# Patient Record
Sex: FEMALE | Race: WHITE | Employment: FULL TIME | URBAN - METROPOLITAN AREA
[De-identification: names, ages, dates, MRNs, and addresses within clinical notes are randomized per-mention and may not be internally consistent; named-entity substitution may affect disease eponyms.]

---

## 2019-10-09 ENCOUNTER — OFFICE VISIT (OUTPATIENT)
Dept: ORTHOPEDIC SURGERY | Age: 54
End: 2019-10-09
Payer: COMMERCIAL

## 2019-10-09 VITALS
HEIGHT: 67 IN | HEART RATE: 56 BPM | SYSTOLIC BLOOD PRESSURE: 115 MMHG | BODY MASS INDEX: 29.03 KG/M2 | WEIGHT: 185 LBS | DIASTOLIC BLOOD PRESSURE: 70 MMHG

## 2019-10-09 DIAGNOSIS — G89.29 CHRONIC PAIN OF LEFT KNEE: Primary | ICD-10-CM

## 2019-10-09 DIAGNOSIS — M17.12 PATELLOFEMORAL ARTHRITIS OF LEFT KNEE: ICD-10-CM

## 2019-10-09 DIAGNOSIS — M25.562 CHRONIC PAIN OF LEFT KNEE: Primary | ICD-10-CM

## 2019-10-09 PROCEDURE — 1036F TOBACCO NON-USER: CPT | Performed by: ORTHOPAEDIC SURGERY

## 2019-10-09 PROCEDURE — G8427 DOCREV CUR MEDS BY ELIG CLIN: HCPCS | Performed by: ORTHOPAEDIC SURGERY

## 2019-10-09 PROCEDURE — 3017F COLORECTAL CA SCREEN DOC REV: CPT | Performed by: ORTHOPAEDIC SURGERY

## 2019-10-09 PROCEDURE — 99203 OFFICE O/P NEW LOW 30 MIN: CPT | Performed by: ORTHOPAEDIC SURGERY

## 2019-10-09 PROCEDURE — G8484 FLU IMMUNIZE NO ADMIN: HCPCS | Performed by: ORTHOPAEDIC SURGERY

## 2019-10-09 PROCEDURE — G8419 CALC BMI OUT NRM PARAM NOF/U: HCPCS | Performed by: ORTHOPAEDIC SURGERY

## 2019-10-09 RX ORDER — ATENOLOL 25 MG/1
25 TABLET ORAL DAILY
COMMUNITY
Start: 2019-08-23

## 2019-10-09 RX ORDER — ZOLPIDEM TARTRATE 5 MG/1
5 TABLET ORAL NIGHTLY
COMMUNITY
Start: 2019-08-22

## 2019-10-09 RX ORDER — LEVOTHYROXINE SODIUM 0.12 MG/1
125 TABLET ORAL DAILY
COMMUNITY
Start: 2019-09-12

## 2019-10-09 RX ORDER — OXYBUTYNIN CHLORIDE 10 MG/1
10 TABLET, EXTENDED RELEASE ORAL DAILY
COMMUNITY
Start: 2019-08-22

## 2021-02-24 ENCOUNTER — OFFICE VISIT (OUTPATIENT)
Dept: ORTHOPEDIC SURGERY | Age: 56
End: 2021-02-24
Payer: COMMERCIAL

## 2021-02-24 VITALS
TEMPERATURE: 97.8 F | DIASTOLIC BLOOD PRESSURE: 66 MMHG | HEART RATE: 65 BPM | HEIGHT: 68 IN | WEIGHT: 185 LBS | BODY MASS INDEX: 28.04 KG/M2 | SYSTOLIC BLOOD PRESSURE: 103 MMHG

## 2021-02-24 DIAGNOSIS — G89.29 CHRONIC PAIN OF LEFT KNEE: Primary | ICD-10-CM

## 2021-02-24 DIAGNOSIS — M25.562 CHRONIC PAIN OF LEFT KNEE: Primary | ICD-10-CM

## 2021-02-24 DIAGNOSIS — M17.12 PRIMARY OSTEOARTHRITIS OF LEFT KNEE: ICD-10-CM

## 2021-02-24 PROCEDURE — 99214 OFFICE O/P EST MOD 30 MIN: CPT | Performed by: PHYSICIAN ASSISTANT

## 2021-02-24 PROCEDURE — 20610 DRAIN/INJ JOINT/BURSA W/O US: CPT | Performed by: PHYSICIAN ASSISTANT

## 2021-02-24 RX ORDER — LIDOCAINE HYDROCHLORIDE 10 MG/ML
20 INJECTION, SOLUTION INFILTRATION; PERINEURAL ONCE
Status: COMPLETED | OUTPATIENT
Start: 2021-02-24 | End: 2021-02-24

## 2021-02-24 RX ORDER — TRIAMCINOLONE ACETONIDE 40 MG/ML
40 INJECTION, SUSPENSION INTRA-ARTICULAR; INTRAMUSCULAR ONCE
Status: COMPLETED | OUTPATIENT
Start: 2021-02-24 | End: 2021-02-24

## 2021-02-24 RX ORDER — BUPIVACAINE HYDROCHLORIDE 2.5 MG/ML
30 INJECTION, SOLUTION INFILTRATION; PERINEURAL ONCE
Status: COMPLETED | OUTPATIENT
Start: 2021-02-24 | End: 2021-02-24

## 2021-02-24 RX ORDER — ESTRADIOL 0.5 MG/1
0.5 TABLET ORAL SEE ADMIN INSTRUCTIONS
COMMUNITY
Start: 2020-09-08

## 2021-02-24 RX ADMIN — LIDOCAINE HYDROCHLORIDE 20 ML: 10 INJECTION, SOLUTION INFILTRATION; PERINEURAL at 15:52

## 2021-02-24 RX ADMIN — TRIAMCINOLONE ACETONIDE 40 MG: 40 INJECTION, SUSPENSION INTRA-ARTICULAR; INTRAMUSCULAR at 15:51

## 2021-02-24 RX ADMIN — BUPIVACAINE HYDROCHLORIDE 75 MG: 2.5 INJECTION, SOLUTION INFILTRATION; PERINEURAL at 15:51

## 2021-02-24 NOTE — PROGRESS NOTES
Patient Name: Herbert Castellon  : 1965  DOS: 2021        Chief Complaint:   Chief Complaint   Patient presents with    Knee Pain     Left knee       History of Present Illness:  Herbert Castellon is a 55 y.o. female who presents with a chief complaint of continued complaints of pain in the knee with irritation with walking, stairs and with overuse. Pain in the knee regularly with swelling and discomfort. Increase in pain over the past several years time. Currently: 2021  Patient is here for follow-up regarding bilateral knee pain and osteoarthritis of the left knee being worse than the right. She notes that the left knee is the greater focus of today's visit rating her pain is a 5-6 out of 10. She notes that she was doing fairly well until a few months ago when pain started to recur generalized around the knee mostly on the medial and anterior aspects of the knee. She denies any fall trauma or injury denies numbness burning tingling radiating pains in the leg. Notes occasional utilization of compression brace or sleeve across the knee denies utilization of any assistive walking devices. She is wanting to reevaluate the knee as to where it is in regards to the current treatment plan. She notes that she was previously utilizing anti-inflammatories which did help but she has run out over the past few months. She also notes that utilization of the steroid injections did help but has not had in a long time. She also notes that the most recent scope that she had helped significantly as well. So she is trying to figure out if she is in need of surgical intervention again or if conservative management can help to maintain her condition. Notes increasing irritation with long periods of standing walking and transition. Works in producing airline breaks as a .  She does a lot of standing walking pushing pulling and lifting equipment.       Previously: 10/9/2019  Previous arthroscopy with some relief in pain. 2014 mri with noted lateral articular disease in the joint and patellofemroal chondromalacia and articular cartilage loss. Difficulty with anterior knee pain and some radiation. Recent onset of right sided knee pain with hyperextension        Medical History  Current Medications:   Prior to Admission medications    Medication Sig Start Date End Date Taking? Authorizing Provider   estradiol (ESTRACE) 0.5 MG tablet 0.5 mg by Per G Tube route See Admin Instructions 9/8/20  Yes Historical Provider, MD   diclofenac (VOLTAREN) 50 MG EC tablet Take 1 tablet by mouth 2 times daily (with meals) 10/9/19   Rolando Llanos MD   Homeopathic Products (SPEEDGEL) GEL Apply 1-2 grams topically TID 10/9/19   Rolando Llanos MD   levothyroxine (SYNTHROID) 125 MCG tablet Take 125 mcg by mouth daily 9/12/19   Historical Provider, MD   oxybutynin (DITROPAN-XL) 10 MG extended release tablet Take 10 mg by mouth daily 8/22/19   Historical Provider, MD   atenolol (TENORMIN) 25 MG tablet Take 25 mg by mouth daily 8/23/19   Historical Provider, MD   zolpidem (AMBIEN) 5 MG tablet Take 5 mg by mouth nightly. 8/22/19   Historical Provider, MD   Cholecalciferol (VITAMIN D PO) Take 6,000 Units by mouth    Historical Provider, MD     Allergies: Allergies   Allergen Reactions    Vancomycin Other (See Comments)     Red man syndrome      Amoxicillin     Codeine Other (See Comments)     hallucinations   hallucinates      Penicillins Hives, Nausea And Vomiting, Rash and Swelling       Review of Systems:     Review of Systems ______  Constitutional: Negative for fever and diaphoresis. ____________  Respiratory: Negative for shortness of breath.  ________  Gastrointestinal: Negative for abdominal pain. ________  Musculoskeletal: Positive for joint pain. ____  Skin: Negative for itching. ____  Neurological: Negative for loss of consciousness.  ______________  All other systems reviewed and negative     Past Medical History:   Diagnosis Date    Thyroid disease      Family History   Problem Relation Age of Onset    Cancer Mother     Diabetes Mother     No Known Problems Father      Social History     Socioeconomic History    Marital status:      Spouse name: Not on file    Number of children: Not on file    Years of education: Not on file    Highest education level: Not on file   Occupational History    Not on file   Social Needs    Financial resource strain: Not on file    Food insecurity     Worry: Not on file     Inability: Not on file    Transportation needs     Medical: Not on file     Non-medical: Not on file   Tobacco Use    Smoking status: Former Smoker    Smokeless tobacco: Never Used   Substance and Sexual Activity    Alcohol use: Not on file    Drug use: Not on file    Sexual activity: Not on file   Lifestyle    Physical activity     Days per week: Not on file     Minutes per session: Not on file    Stress: Not on file   Relationships    Social connections     Talks on phone: Not on file     Gets together: Not on file     Attends Baptism service: Not on file     Active member of club or organization: Not on file     Attends meetings of clubs or organizations: Not on file     Relationship status: Not on file    Intimate partner violence     Fear of current or ex partner: Not on file     Emotionally abused: Not on file     Physically abused: Not on file     Forced sexual activity: Not on file   Other Topics Concern    Not on file   Social History Narrative    Not on file         Physical Exam __  Constitutional: She is oriented to person, place, and time and well-developed, well-nourished, and in no distress. No distress. ____  HENT:   Head: Normocephalic and atraumatic. ____  Eyes: Conjunctivae are normal. ________  Cardiovascular: Intact distal pulses.   ____  Pulmonary/Chest: Effort normal. ________________________  Neurological: She is alert and oriented to person, place, and time. ____  Skin: Skin is dry. She is not diaphoretic. ____  Psychiatric: Mood, affect and judgment normal. ______          Assessment   Vital Signs:      Vitals:    02/24/21 1430   BP: 103/66   Pulse: 65   Temp: 97.8 °F (36.6 °C)       left Knee shows evidence for mild varus pseudolaxity, pain with weight bearing, antalgic gait and obvious palpable osteophytes. Inspection: Moderate anterior swelling. Swelling is present with  Mild effusion. The posterior aspect of the knee appears to be full with tenderness. There is no erythema, rash, or ecchymosis. Range of Motion:  Right 0-120 left0-120     Pain with patellofemroal  testing    There is deformity mild varus    Strength:  Hamstrings rated: 4/5. Quadriceps rated: 5/5    Palpation: There is moderate tenderness along the patellofemroal and medial left joint line. Special Tests: Patellar Compression test is positive. Valgus & Varus test is positive. Skin: There are no rashes, ulcerations or lesions. Gait: Gait pattern is antalgic  Skin shows no rashes/ecchymosis to the affected area, no hyperesthesias, no discoloration, no temperature or color discrepancies. NEUROLOGICALLY: There is no evidence for sensory or motor deficits in the extremity. Coordination appears full with no spacticity or rigidity. Reflexes appear to be symmetric. Distal circulation intact. No signs of RSD. Additional Comments:pez planus with noted area of posterior tibial insufficiency on the right side with pain. Right sided area of patellofemroal pain and lateral jiont irritation of the patella. No instbiliy of either patella or knee. Procedure(s): Injection Procedure Note  Procedure Details     Verbal consent was obtained for the procedure. The left knee(s) was prepped with iodine and the skin was anesthetized.  Using a 22 gauge needle the left knee(s) joint is injected with 2 ml 1% lidocaine and 2 ml of triamcinolone (KENALOG) 40mg/ml under the lateral aspect of the knee. The injection site was cleansed with topical isopropyl alcohol and a dressing was applied. Complications:  None; patient tolerated the procedure well. Diagnostic Test Findings:   Xray   Have reviewed the xrays above from 02/24/21   and my impression is:4 view x-ray of the left knee were performed and reviewed today ap pa lateral and sunrise. Left knee with mild medial jiont space narrowing and min patellofemroal osteophytes. Right knee with very few changes in the knee other than mild osteophytes at the patella. Assessment and Plan:       Diagnosis Orders   1. Chronic pain of left knee  XR KNEE LEFT (MIN 4 VIEWS)    [place] ARTHROCENTESIS ASPIR&/INJ MAJOR JT/BURSA W/O US    triamcinolone acetonide (KENALOG-40) injection 40 mg    lidocaine 1 % injection 20 mL    bupivacaine (MARCAINE) 0.25 % injection 75 mg              The orders below, if any, were placed during this visit:   Orders Placed This Encounter   Procedures    XR KNEE LEFT (MIN 4 VIEWS)     Standing Status:   Future     Number of Occurrences:   1     Standing Expiration Date:   3/24/2021     Order Specific Question:   Reason for exam:     Answer:   Pain    [place] ARTHROCENTESIS ASPIR&/INJ MAJOR JT/BURSA W/O US              Treatment Plan: right sided patellofemroal pain. We discussed the pathology of patellofemoral chondromalacia and the overload of the patellofemoral surfaces, benefits of hamstring stretching and a quadriceps strengthening program, especially vastus medialis strengthening. I also discussed the benefits of a patellar stabilizing brace and NSAIDS. A short course of physical therapy was written for 2-3x/wk for 4 weeks. Home exercise program upon discharge. Follow up in 4-6 weeks when physical therapy is completed for re-evaluation. Left sided osteoarthritis. I discussed the diagnosis of arthritis with the patient.   We've discussed treatment options which would include anti-inflammatory medication, physical therapy, bracing, cortisone injections, and Visco supplementation. We have also discussed the benefits of low impact exercise and weight loss. We have also discussed the possibility of joint replacement surgery in the future. Provided patient with cortisone injection at today's visit no complaints or concerns arisen advised to rest elevation of the next 24 hours utilization of ice of the next 24 hours can resume normal activities after 24 hours. We will restart utilization of diclofenac 50 mg twice daily  Plan for follow-up in 3 to 6 months for repeat evaluation possible repeat cortisone injection if need be. -Antonio potential pursuance of MRI if limited improvement is noted with utilization of the cortisone injections and anti-inflammatories. Start on natural supplemetns. Shoe inserts for the pez planus,   chopat strap for the right knee. Start on physical therpay for the knees   Spent 30 minutes in discussion with the patient regarding conditions and current treatment plan  Plan for additional injections and treatment as neede. JONATHAN Whittington

## 2021-02-24 NOTE — PROGRESS NOTES
Administrations This Visit     bupivacaine (MARCAINE) 0.25 % injection 75 mg     Admin Date  02/24/2021  15:51 Action  Given Dose  75 mg Route  Intradermal Site  Knee Left [facility]  [facility]    Ordering Provider: JONATHAN Thompson    NDC: 44177-345-13    Lot#: [id number]    : [facility]    Patient Supplied?: No          lidocaine 1 % injection 20 mL     Admin Date  02/24/2021  15:52 Action  Given Dose  20 mL Route  Intradermal Site  Knee Left Administered By  [facility]    Ordering Provider: JONATHAN Thompson    NDC: 6194-5227-77    Lot#: [id number]    : [facility]    Patient Supplied?: No          triamcinolone acetonide (KENALOG-40) injection 40 mg     Admin Date  02/24/2021  15:51 Action  Given Dose  40 mg Route  Intramuscular Site  Knee Left Administered By  [facility]    Ordering Provider: JONATHAN Thompson    NDC: 0032-4564-80    Lot#: [id number]    : [facility]    Patient Supplied?: No

## 2021-05-27 ENCOUNTER — OFFICE VISIT (OUTPATIENT)
Dept: ORTHOPEDIC SURGERY | Age: 56
End: 2021-05-27
Payer: COMMERCIAL

## 2021-05-27 VITALS
WEIGHT: 185 LBS | HEART RATE: 64 BPM | DIASTOLIC BLOOD PRESSURE: 79 MMHG | SYSTOLIC BLOOD PRESSURE: 118 MMHG | BODY MASS INDEX: 28.04 KG/M2 | HEIGHT: 68 IN

## 2021-05-27 DIAGNOSIS — M25.562 CHRONIC PAIN OF LEFT KNEE: Primary | ICD-10-CM

## 2021-05-27 DIAGNOSIS — M17.12 PRIMARY OSTEOARTHRITIS OF LEFT KNEE: ICD-10-CM

## 2021-05-27 DIAGNOSIS — M79.662 PAIN AND SWELLING OF LEFT LOWER LEG: ICD-10-CM

## 2021-05-27 DIAGNOSIS — G89.29 CHRONIC PAIN OF LEFT KNEE: Primary | ICD-10-CM

## 2021-05-27 DIAGNOSIS — M79.89 PAIN AND SWELLING OF LEFT LOWER LEG: ICD-10-CM

## 2021-05-27 PROCEDURE — 99214 OFFICE O/P EST MOD 30 MIN: CPT | Performed by: PHYSICIAN ASSISTANT

## 2021-05-27 RX ORDER — PREDNISONE 10 MG/1
10 TABLET ORAL DAILY
Qty: 10 TABLET | Refills: 0 | Status: SHIPPED | OUTPATIENT
Start: 2021-05-27 | End: 2021-06-06

## 2021-05-28 NOTE — PROGRESS NOTES
Patient Name: Mandy Pozo  : 1965  DOS: 2021        Chief Complaint:   Chief Complaint   Patient presents with    Knee Pain     LEFT Euna Avers on 21 helped. Now have pain and swelling. History of Present Illness:  Mandy Pozo is a 55 y.o. female who presents with a chief complaint of continued complaints of pain in the knee with irritation with walking, stairs and with overuse. Pain in the knee regularly with swelling and discomfort. Increase in pain over the past several years time. Currently: 2021  Patient is here for follow-up regarding significant increase in pain on the left knee. 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 Previously: 2021  Patient is here for follow-up regarding bilateral knee pain and osteoarthritis of the left knee being worse than the right. She notes that the left knee is the greater focus of today's visit rating her pain is a 5-6 out of 10.   She notes that she was doing fairly well until a few months ago when pain started to recur generalized around the knee mostly on the medial and anterior aspects of (SYNTHROID) 125 MCG tablet Take 125 mcg by mouth daily 9/12/19   Historical Provider, MD   oxybutynin (DITROPAN-XL) 10 MG extended release tablet Take 10 mg by mouth daily 8/22/19   Historical Provider, MD   atenolol (TENORMIN) 25 MG tablet Take 25 mg by mouth daily 8/23/19   Historical Provider, MD   zolpidem (AMBIEN) 5 MG tablet Take 5 mg by mouth nightly. 8/22/19   Historical Provider, MD   Cholecalciferol (VITAMIN D PO) Take 6,000 Units by mouth    Historical Provider, MD     Allergies: Allergies   Allergen Reactions    Vancomycin Other (See Comments)     Red man syndrome      Amoxicillin     Codeine Other (See Comments)     hallucinations   hallucinates      Penicillins Hives, Nausea And Vomiting, Rash and Swelling       Review of Systems:     Review of Systems ______  Constitutional: Negative for fever and diaphoresis. ____________  Respiratory: Negative for shortness of breath.  ________  Gastrointestinal: Negative for abdominal pain. ________  Musculoskeletal: Positive for joint pain. ____  Skin: Negative for itching. ____  Neurological: Negative for loss of consciousness.  ______________  All other systems reviewed and negative     Past Medical History:   Diagnosis Date    Thyroid disease      Family History   Problem Relation Age of Onset    Cancer Mother     Diabetes Mother     No Known Problems Father      Social History     Socioeconomic History    Marital status:      Spouse name: Not on file    Number of children: Not on file    Years of education: Not on file    Highest education level: Not on file   Occupational History    Not on file   Tobacco Use    Smoking status: Former Smoker    Smokeless tobacco: Never Used   Substance and Sexual Activity    Alcohol use: Not on file    Drug use: Not on file    Sexual activity: Not on file   Other Topics Concern    Not on file   Social History Narrative    Not on file     Social Determinants of Health     Financial Resource Strain:  Difficulty of Paying Living Expenses:    Food Insecurity:     Worried About Running Out of Food in the Last Year:     Ran Out of Food in the Last Year:    Transportation Needs:     Lack of Transportation (Medical):  Lack of Transportation (Non-Medical):    Physical Activity:     Days of Exercise per Week:     Minutes of Exercise per Session:    Stress:     Feeling of Stress :    Social Connections:     Frequency of Communication with Friends and Family:     Frequency of Social Gatherings with Friends and Family:     Attends Faith Services:     Active Member of Clubs or Organizations:     Attends Club or Organization Meetings:     Marital Status:    Intimate Partner Violence:     Fear of Current or Ex-Partner:     Emotionally Abused:     Physically Abused:     Sexually Abused:          Physical Exam __  Constitutional: She is oriented to person, place, and time and well-developed, well-nourished, and in no distress. No distress. ____  HENT:   Head: Normocephalic and atraumatic. ____  Eyes: Conjunctivae are normal. ________  Cardiovascular: Intact distal pulses. ____  Pulmonary/Chest: Effort normal. ________________________  Neurological: She is alert and oriented to person, place, and time. ____  Skin: Skin is dry. She is not diaphoretic. ____  Psychiatric: Mood, affect and judgment normal. ______          Assessment   Vital Signs:      Vitals:    05/27/21 1522   BP: 118/79   Pulse: 64       left Knee shows evidence for mild varus pseudolaxity, pain with weight bearing, antalgic gait and obvious palpable osteophytes. Inspection: Moderate anterior swelling. Swelling is present with  Mild effusion. The posterior aspect of the knee appears to be full with tenderness. There is no erythema, rash, or ecchymosis. Range of Motion:  Right 0-120 left0-120     Pain with patellofemroal  testing    There is deformity mild varus    Strength:  Hamstrings rated: 4/5.  Quadriceps rated: 5/5 Palpation: There is severe  tenderness along the medial left joint line and to medial tibia and tibia and femur femoral condyle. Moderate tenderness in the patellofemoral joint line    Special Tests: Patellar Compression test is positive. Valgus & Varus test is positive. Skin: There are no rashes, ulcerations or lesions. Gait: Gait pattern is antalgic  Skin shows no rashes/ecchymosis to the affected area, no hyperesthesias, no discoloration, no temperature or color discrepancies. NEUROLOGICALLY: There is no evidence for sensory or motor deficits in the extremity. Coordination appears full with no spacticity or rigidity. Reflexes appear to be symmetric. Distal circulation intact. No signs of RSD. Additional Comments:pez planus with noted area of posterior tibial insufficiency on the right side with pain. Right sided area of patellofemroal pain and lateral jiont irritation of the patella. No instbiliy of either patella or knee. Procedure(s): No new procedure performed at today's visit      Diagnostic Test Findings:   Xray   Have reviewed the xrays above from 2/24/2021  and my impression is:4 view x-ray of the left knee were performed and reviewed today ap pa lateral and sunrise. Left knee with mild medial jiont space narrowing and min patellofemroal osteophytes. Right knee with very few changes in the knee other than mild osteophytes at the patella. Assessment and Plan:       Diagnosis Orders   1. Chronic pain of left knee  MRI KNEE LEFT WO CONTRAST   2. Primary osteoarthritis of left knee  MRI KNEE LEFT WO CONTRAST   3.  Pain and swelling of left lower leg  US DUP LOWER EXTREMITY LEFT ADIEL              The orders below, if any, were placed during this visit:   Orders Placed This Encounter   Procedures    US DUP LOWER EXTREMITY LEFT ADIEL     Standing Status:   Future     Standing Expiration Date:   5/27/2022     Order Specific Question:   Reason for exam:     Answer:   rule out DVT    MRI KNEE LEFT WO CONTRAST     Standing Status:   Future     Standing Expiration Date:   5/27/2022     Scheduling Instructions:      Adithya once approved     Order Specific Question:   Reason for exam:     Answer:   rule out fracture or meniscal tear              Treatment Plan: right sided patellofemroal pain. We discussed the pathology of patellofemoral chondromalacia and the overload of the patellofemoral surfaces, benefits of hamstring stretching and a quadriceps strengthening program, especially vastus medialis strengthening. I also discussed the benefits of a patellar stabilizing brace and NSAIDS. A short course of physical therapy was written for 2-3x/wk for 4 weeks. Home exercise program upon discharge. Follow up in 4-6 weeks when physical therapy is completed for re-evaluation. Left sided osteoarthritis. I discussed the diagnosis of arthritis with the patient. We've discussed treatment options which would include anti-inflammatory medication, physical therapy, bracing, cortisone injections, and Visco supplementation. We have also discussed the benefits of low impact exercise and weight loss. We have also discussed the possibility of joint replacement surgery in the future. -Advised to continue utilization of diclofenac  -Ordered MRI of the left knee due to significant increase in pain concerns for stress related fracture or acute meniscal tear. -Gave patient a prednisone 10 mg 1 daily for 10 days to help with reduction pain inflammation  -Advised patient to rest ice and elevate the knee as much as possible to help with reduction in pain. 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 Start on natural supplemetns. Shoe inserts for the pez planus,   chopat strap for the right knee.    Start on physical therpay for the knees   Advised for patient to follow-up after MRI is completed discussed with her the results and treatment options. Spent 30 minutes in discussion with the patient regarding conditions and current treatment plan  Plan for additional injections and treatment as neede. JONATHAN Leonard

## 2021-06-07 ENCOUNTER — TELEPHONE (OUTPATIENT)
Dept: FAMILY MEDICINE CLINIC | Age: 56
End: 2021-06-07

## 2021-06-07 NOTE — TELEPHONE ENCOUNTER
Patient notified that she can go ahead and schedule the MRI. Phone number to schedule given to patient.

## 2021-06-12 ENCOUNTER — HOSPITAL ENCOUNTER (OUTPATIENT)
Dept: MRI IMAGING | Age: 56
Discharge: HOME | End: 2021-06-12
Payer: COMMERCIAL

## 2021-06-12 DIAGNOSIS — G89.29 CHRONIC PAIN OF LEFT KNEE: ICD-10-CM

## 2021-06-12 DIAGNOSIS — M17.12 PRIMARY OSTEOARTHRITIS OF LEFT KNEE: ICD-10-CM

## 2021-06-12 DIAGNOSIS — M25.562 CHRONIC PAIN OF LEFT KNEE: ICD-10-CM

## 2021-06-12 PROCEDURE — 73721 MRI JNT OF LWR EXTRE W/O DYE: CPT

## 2021-06-23 ENCOUNTER — OFFICE VISIT (OUTPATIENT)
Dept: ORTHOPEDIC SURGERY | Age: 56
End: 2021-06-23
Payer: COMMERCIAL

## 2021-06-23 VITALS
DIASTOLIC BLOOD PRESSURE: 67 MMHG | SYSTOLIC BLOOD PRESSURE: 106 MMHG | BODY MASS INDEX: 28.04 KG/M2 | WEIGHT: 185 LBS | HEIGHT: 68 IN | HEART RATE: 59 BPM

## 2021-06-23 DIAGNOSIS — G89.29 CHRONIC PAIN OF LEFT KNEE: Primary | ICD-10-CM

## 2021-06-23 DIAGNOSIS — M17.12 PRIMARY OSTEOARTHRITIS OF LEFT KNEE: ICD-10-CM

## 2021-06-23 DIAGNOSIS — M25.562 CHRONIC PAIN OF LEFT KNEE: Primary | ICD-10-CM

## 2021-06-23 PROCEDURE — 99214 OFFICE O/P EST MOD 30 MIN: CPT | Performed by: ORTHOPAEDIC SURGERY

## 2021-06-23 NOTE — PROGRESS NOTES
Patient Name: Elizabeth Horan  : 1965  DOS: 2021        Chief Complaint:   Chief Complaint   Patient presents with    Knee Pain     LT KNEE-MRI RESULTS     Pain in the knee with irritation and dysfunction despite injection. Only sevreal months of pain control but no improvement once injection wore off. Limited activities overall because of knee disorder. History of Present Illness:  Elizabeth Horan is a 55 y.o. female who presents with a chief complaint of continued complaints of pain in the knee with irritation with walking, stairs and with overuse. Pain in the knee regularly with swelling and discomfort. Increase in pain over the past several years time. Currently: 2021  Patient is here for follow-up regarding significant increase in pain on the left knee. 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 Previously: 2021  Patient is here for follow-up regarding bilateral knee pain and osteoarthritis of the left knee being worse than the right. She notes that the left knee is the greater focus of today's visit rating her pain is a 5-6 out of 10. She notes that she was doing fairly well until a few months ago when pain started to recur generalized around the knee mostly on the medial and anterior aspects of the knee. She denies any fall trauma or injury denies numbness burning tingling radiating pains in the leg. Notes occasional utilization of compression brace or sleeve across the knee denies utilization of any assistive walking devices. She is wanting to reevaluate the knee as to where it is in regards to the current treatment plan. She notes that she was previously utilizing anti-inflammatories which did help but she has run out over the past few months. She also notes that utilization of the steroid injections did help but has not had in a long time. She also notes that the most recent scope that she had helped significantly as well. So she is trying to figure out if she is in need of surgical intervention again or if conservative management can help to maintain her condition. Notes increasing irritation with long periods of standing walking and transition. Works in producing airline breaks as a .  She does a lot of standing walking pushing pulling and lifting equipment. Previously: 10/9/2019  Previous arthroscopy with some relief in pain. 2014 mri with noted lateral articular disease in the joint and patellofemroal chondromalacia and articular cartilage loss. Difficulty with anterior knee pain and some radiation. Recent onset of right sided knee pain with hyperextension        Medical History  Current Medications:   Prior to Admission medications    Medication Sig Start Date End Date Taking?  Authorizing Provider   estradiol (ESTRACE) 0.5 MG tablet 0.5 mg by Per G Tube route See Admin Instructions 9/8/20   Historical Provider, MD   diclofenac (VOLTAREN) 50 MG EC tablet Take 1 tablet by mouth 2 times daily (with meals) 2/24/21   JONATHAN Vasquez   Homeopathic Products (SPEEDGEL) GEL Apply 1-2 grams topically TID 10/9/19   Jigar Shea MD   levothyroxine (SYNTHROID) 125 MCG tablet Take 125 mcg by mouth daily 9/12/19   Historical Provider, MD   oxybutynin (DITROPAN-XL) 10 MG extended release tablet Take 10 mg by mouth daily 8/22/19   Historical Provider, MD   atenolol (TENORMIN) 25 MG tablet Take 25 mg by mouth daily 8/23/19   Historical Provider, MD   zolpidem (AMBIEN) 5 MG tablet Take 5 mg by mouth nightly. 8/22/19   Historical Provider, MD   Cholecalciferol (VITAMIN D PO) Take 6,000 Units by mouth    Historical Provider, MD     Allergies: Allergies   Allergen Reactions    Vancomycin Other (See Comments)     Red man syndrome      Amoxicillin     Codeine Other (See Comments)     hallucinations   hallucinates      Penicillins Hives, Nausea And Vomiting, Rash and Swelling       Review of Systems:     Review of Systems ______  Constitutional: Negative for fever and diaphoresis. ____________  Respiratory: Negative for shortness of breath.  ________  Gastrointestinal: Negative for abdominal pain. ________  Musculoskeletal: Positive for joint pain. ____  Skin: Negative for itching. ____  Neurological: Negative for loss of consciousness.  ______________  All other systems reviewed and negative     Past Medical History:   Diagnosis Date    Thyroid disease      Family History   Problem Relation Age of Onset    Cancer Mother     Diabetes Mother     No Known Problems Father      Social History     Socioeconomic History    Marital status:      Spouse name: Not on file    Number of children: Not on file    Years of education: Not on file    Highest education level: Not on file   Occupational History    Not on file   Tobacco Use    Smoking status: Former Smoker    Smokeless tobacco: Never Used   Substance and Sexual Activity    Alcohol use: Not on file    Drug use: Not on file    Sexual activity: Not on file   Other Topics Concern    Not on file   Social History Narrative    Not on file     Social Determinants of Health     Financial Resource Strain:     Difficulty of Paying Living Expenses:    Food Insecurity:     Worried About Running Out of Food in the Last Year:     [address] in the Last Year:    Transportation Needs:     Lack of Transportation (Medical):  Lack of Transportation (Non-Medical):    Physical Activity:     Days of Exercise per Week:     Minutes of Exercise per Session:    Stress:     Feeling of Stress :    Social Connections:     Frequency of Communication with Friends and Family:     Frequency of Social Gatherings with Friends and Family:     Attends Islam Services:     Active Member of Clubs or Organizations:     Attends Club or Organization Meetings:     Marital Status:    Intimate Partner Violence:     Fear of Current or Ex-Partner:     Emotionally Abused:     Physically Abused:     Sexually Abused:          Physical Exam __  Constitutional: She is oriented to person, place, and time and well-developed, well-nourished, and in no distress. No distress. ____  HENT:   Head: Normocephalic and atraumatic. ____  Eyes: Conjunctivae are normal. ________  Cardiovascular: Intact distal pulses. ____  Pulmonary/Chest: Effort normal. ________________________  Neurological: She is alert and oriented to person, place, and time. ____  Skin: Skin is dry. She is not diaphoretic. ____  Psychiatric: Mood, affect and judgment normal. ______          Assessment   Vital Signs:      Vitals:    06/23/21 1410   BP: 106/67   Pulse: 59       left Knee shows evidence for mild varus pseudolaxity, pain with weight bearing, antalgic gait and obvious palpable osteophytes. Inspection: Moderate anterior swelling. Swelling is present with  Mild effusion. The posterior aspect of the knee appears to be full with tenderness. There is no erythema, rash, or ecchymosis.      Range of Motion:  Right 0-120 left0-120     Pain with patellofemroal  testing    There is deformity mild varus    Strength:  Hamstrings rated: 4/5. Quadriceps rated: 5/5    Palpation: There is severe  tenderness along the medial left joint line and to medial tibia and tibia and femur femoral condyle. Moderate tenderness in the patellofemoral joint line    Special Tests: Patellar Compression test is positive. Valgus & Varus test is positive. Skin: There are no rashes, ulcerations or lesions. Gait: Gait pattern is antalgic  Skin shows no rashes/ecchymosis to the affected area, no hyperesthesias, no discoloration, no temperature or color discrepancies. NEUROLOGICALLY: There is no evidence for sensory or motor deficits in the extremity. Coordination appears full with no spacticity or rigidity. Reflexes appear to be symmetric. Distal circulation intact. No signs of RSD. Additional Comments:pez planus with noted area of posterior tibial insufficiency on the right side with pain. Right sided area of patellofemroal pain and lateral jiont irritation of the patella. No instbiliy of either patella or knee. Procedure(s): No new procedure performed at today's visit      Diagnostic Test Findings:   Xray   Have reviewed the xrays above from 2/24/2021  and my impression is:4 view x-ray of the left knee were performed and reviewed today ap pa lateral and sunrise. Left knee with mild medial jiont space narrowing and min patellofemroal osteophytes. Right knee with very few changes in the knee other than mild osteophytes at the patella. Assessment and Plan:       Diagnosis Orders   1. Chronic pain of left knee     2. Primary osteoarthritis of left knee                The orders below, if any, were placed during this visit:   No orders of the defined types were placed in this encounter. Treatment Plan: right sided patellofemroal pain.    We discussed the pathology of patellofemoral chondromalacia and the overload of the patellofemoral surfaces, benefits of hamstring stretching and a quadriceps strengthening program, especially vastus medialis strengthening. I also discussed the benefits of a patellar stabilizing brace and NSAIDS. A short course of physical therapy was written for 2-3x/wk for 4 weeks. Home exercise program upon discharge. Follow up in 4-6 weeks when physical therapy is completed for re-evaluation. Left sided osteoarthritis. I discussed the diagnosis of arthritis with the patient. We've discussed treatment options which would include anti-inflammatory medication, physical therapy, bracing, cortisone injections, and Visco supplementation. We have also discussed the benefits of low impact exercise and weight loss. We have also discussed the possibility of joint replacement surgery in the future. -Advised to continue utilization of diclofenac  - MRI of the left knee due to significant increase in pain and noted for stress related fracture or acute meniscal tear. Start on natural supplemetns.    Shoe inserts for the pez planus,           Spent 30 minutes in discussion with the patient regarding conditions and current treatment plan  Plan for additional injections and treatment as neede.carmen Li MD

## 2021-07-07 ENCOUNTER — TELEPHONE (OUTPATIENT)
Dept: ORTHOPEDIC SURGERY | Age: 56
End: 2021-07-07

## 2021-07-07 NOTE — TELEPHONE ENCOUNTER
Appointment Request     Patient requesting earlier appointment: No  Appointment offered to patient: post op/ none/unable to find an appt in timeframe of surgery 07/21. Patient only wants to see Dr. Mark Anthony Adam.   Patient Contact Number: [phone]

## 2021-07-09 ENCOUNTER — TELEPHONE (OUTPATIENT)
Dept: ORTHOPEDIC SURGERY | Age: 56
End: 2021-07-09

## 2021-07-20 ENCOUNTER — TELEPHONE (OUTPATIENT)
Dept: ORTHOPEDIC SURGERY | Age: 56
End: 2021-07-20

## 2021-07-20 ENCOUNTER — ANESTHESIA EVENT (OUTPATIENT)
Dept: OPERATING ROOM | Age: 56
End: 2021-07-20
Payer: COMMERCIAL

## 2021-07-21 ENCOUNTER — HOSPITAL ENCOUNTER (OUTPATIENT)
Age: 56
Setting detail: OUTPATIENT SURGERY
Discharge: HOME | End: 2021-07-21
Attending: ORTHOPAEDIC SURGERY | Admitting: ORTHOPAEDIC SURGERY
Payer: COMMERCIAL

## 2021-07-21 ENCOUNTER — ANESTHESIA (OUTPATIENT)
Dept: OPERATING ROOM | Age: 56
End: 2021-07-21
Payer: COMMERCIAL

## 2021-07-21 VITALS
BODY MASS INDEX: 29.03 KG/M2 | WEIGHT: 185 LBS | DIASTOLIC BLOOD PRESSURE: 78 MMHG | TEMPERATURE: 97.9 F | SYSTOLIC BLOOD PRESSURE: 148 MMHG | RESPIRATION RATE: 20 BRPM | OXYGEN SATURATION: 99 % | HEART RATE: 88 BPM | HEIGHT: 67 IN

## 2021-07-21 VITALS
OXYGEN SATURATION: 98 % | DIASTOLIC BLOOD PRESSURE: 56 MMHG | TEMPERATURE: 98.6 F | SYSTOLIC BLOOD PRESSURE: 90 MMHG | RESPIRATION RATE: 19 BRPM

## 2021-07-21 DIAGNOSIS — Z98.890 STATUS POST ARTHROSCOPY OF RIGHT KNEE: Primary | ICD-10-CM

## 2021-07-21 PROCEDURE — 6360000002 HC RX W HCPCS

## 2021-07-21 PROCEDURE — 7100000001 HC PACU RECOVERY - ADDTL 15 MIN: Performed by: ORTHOPAEDIC SURGERY

## 2021-07-21 PROCEDURE — 2580000003 HC RX 258: Performed by: ANESTHESIOLOGY

## 2021-07-21 PROCEDURE — 3700000000 HC ANESTHESIA ATTENDED CARE: Performed by: ORTHOPAEDIC SURGERY

## 2021-07-21 PROCEDURE — 3700000001 HC ADD 15 MINUTES (ANESTHESIA): Performed by: ORTHOPAEDIC SURGERY

## 2021-07-21 PROCEDURE — 2500000003 HC RX 250 WO HCPCS: Performed by: ORTHOPAEDIC SURGERY

## 2021-07-21 PROCEDURE — 6360000002 HC RX W HCPCS: Performed by: ORTHOPAEDIC SURGERY

## 2021-07-21 PROCEDURE — 2580000003 HC RX 258: Performed by: ORTHOPAEDIC SURGERY

## 2021-07-21 PROCEDURE — 2500000003 HC RX 250 WO HCPCS

## 2021-07-21 PROCEDURE — C1763 CONN TISS, NON-HUMAN: HCPCS | Performed by: ORTHOPAEDIC SURGERY

## 2021-07-21 PROCEDURE — 29877 ARTHRS KNEE SURG DBRDMT/SHVG: CPT | Performed by: ORTHOPAEDIC SURGERY

## 2021-07-21 PROCEDURE — 6370000000 HC RX 637 (ALT 250 FOR IP): Performed by: PHYSICIAN ASSISTANT

## 2021-07-21 PROCEDURE — 20600 DRAIN/INJ JOINT/BURSA W/O US: CPT | Performed by: ORTHOPAEDIC SURGERY

## 2021-07-21 PROCEDURE — 2720000010 HC SURG SUPPLY STERILE: Performed by: ORTHOPAEDIC SURGERY

## 2021-07-21 PROCEDURE — 3600000014 HC SURGERY LEVEL 4 ADDTL 15MIN: Performed by: ORTHOPAEDIC SURGERY

## 2021-07-21 PROCEDURE — 27599 UNLISTED PX FEMUR/KNEE: CPT | Performed by: PHYSICIAN ASSISTANT

## 2021-07-21 PROCEDURE — 3600000004 HC SURGERY LEVEL 4 BASE: Performed by: ORTHOPAEDIC SURGERY

## 2021-07-21 PROCEDURE — 20900 REMOVAL OF BONE FOR GRAFT: CPT | Performed by: ORTHOPAEDIC SURGERY

## 2021-07-21 PROCEDURE — 7100000011 HC PHASE II RECOVERY - ADDTL 15 MIN: Performed by: ORTHOPAEDIC SURGERY

## 2021-07-21 PROCEDURE — 7100000000 HC PACU RECOVERY - FIRST 15 MIN: Performed by: ORTHOPAEDIC SURGERY

## 2021-07-21 PROCEDURE — 2709999900 HC NON-CHARGEABLE SUPPLY: Performed by: ORTHOPAEDIC SURGERY

## 2021-07-21 PROCEDURE — 77002 NEEDLE LOCALIZATION BY XRAY: CPT | Performed by: ORTHOPAEDIC SURGERY

## 2021-07-21 PROCEDURE — 6360000002 HC RX W HCPCS: Performed by: ANESTHESIOLOGY

## 2021-07-21 PROCEDURE — 27599 UNLISTED PX FEMUR/KNEE: CPT | Performed by: ORTHOPAEDIC SURGERY

## 2021-07-21 PROCEDURE — 7100000010 HC PHASE II RECOVERY - FIRST 15 MIN: Performed by: ORTHOPAEDIC SURGERY

## 2021-07-21 PROCEDURE — 6370000000 HC RX 637 (ALT 250 FOR IP): Performed by: ORTHOPAEDIC SURGERY

## 2021-07-21 DEVICE — GRAFT BONE SUB 5CC FIL BONE VOID SOLUM IV: Type: IMPLANTABLE DEVICE | Site: KNEE | Status: FUNCTIONAL

## 2021-07-21 RX ORDER — SODIUM CHLORIDE 0.9 % (FLUSH) 0.9 %
5-40 SYRINGE (ML) INJECTION EVERY 12 HOURS SCHEDULED
Status: DISCONTINUED | OUTPATIENT
Start: 2021-07-21 | End: 2021-07-21 | Stop reason: HOSPADM

## 2021-07-21 RX ORDER — OXYCODONE HYDROCHLORIDE AND ACETAMINOPHEN 5; 325 MG/1; MG/1
2 TABLET ORAL PRN
Status: COMPLETED | OUTPATIENT
Start: 2021-07-21 | End: 2021-07-21

## 2021-07-21 RX ORDER — 0.9 % SODIUM CHLORIDE 0.9 %
INTRAVENOUS SOLUTION INTRAVENOUS CONTINUOUS PRN
Status: COMPLETED | OUTPATIENT
Start: 2021-07-21 | End: 2021-07-21

## 2021-07-21 RX ORDER — HEPARIN SODIUM 5000 [USP'U]/ML
INJECTION, SOLUTION INTRAVENOUS; SUBCUTANEOUS
Status: DISCONTINUED
Start: 2021-07-21 | End: 2021-07-21 | Stop reason: WASHOUT

## 2021-07-21 RX ORDER — SODIUM CHLORIDE 9 MG/ML
25 INJECTION, SOLUTION INTRAVENOUS PRN
Status: DISCONTINUED | OUTPATIENT
Start: 2021-07-21 | End: 2021-07-21 | Stop reason: HOSPADM

## 2021-07-21 RX ORDER — OXYCODONE HYDROCHLORIDE AND ACETAMINOPHEN 5; 325 MG/1; MG/1
1 TABLET ORAL PRN
Status: COMPLETED | OUTPATIENT
Start: 2021-07-21 | End: 2021-07-21

## 2021-07-21 RX ORDER — CELECOXIB 200 MG/1
200 CAPSULE ORAL ONCE
Status: COMPLETED | OUTPATIENT
Start: 2021-07-21 | End: 2021-07-21

## 2021-07-21 RX ORDER — ONDANSETRON 2 MG/ML
4 INJECTION INTRAMUSCULAR; INTRAVENOUS PRN
Status: DISCONTINUED | OUTPATIENT
Start: 2021-07-21 | End: 2021-07-21 | Stop reason: HOSPADM

## 2021-07-21 RX ORDER — SODIUM CHLORIDE 0.9 % (FLUSH) 0.9 %
5-40 SYRINGE (ML) INJECTION PRN
Status: DISCONTINUED | OUTPATIENT
Start: 2021-07-21 | End: 2021-07-21 | Stop reason: HOSPADM

## 2021-07-21 RX ORDER — ONDANSETRON 2 MG/ML
INJECTION INTRAMUSCULAR; INTRAVENOUS PRN
Status: DISCONTINUED | OUTPATIENT
Start: 2021-07-21 | End: 2021-07-21 | Stop reason: SDUPTHER

## 2021-07-21 RX ORDER — OXYCODONE HYDROCHLORIDE AND ACETAMINOPHEN 5; 325 MG/1; MG/1
1 TABLET ORAL EVERY 6 HOURS PRN
Qty: 28 TABLET | Refills: 0 | Status: SHIPPED | OUTPATIENT
Start: 2021-07-21 | End: 2021-07-28

## 2021-07-21 RX ORDER — OXYCODONE HYDROCHLORIDE AND ACETAMINOPHEN 5; 325 MG/1; MG/1
1 TABLET ORAL PRN
Status: DISCONTINUED | OUTPATIENT
Start: 2021-07-21 | End: 2021-07-21 | Stop reason: HOSPADM

## 2021-07-21 RX ORDER — HYDRALAZINE HYDROCHLORIDE 20 MG/ML
5 INJECTION INTRAMUSCULAR; INTRAVENOUS EVERY 10 MIN PRN
Status: DISCONTINUED | OUTPATIENT
Start: 2021-07-21 | End: 2021-07-21 | Stop reason: HOSPADM

## 2021-07-21 RX ORDER — SODIUM CHLORIDE, SODIUM LACTATE, POTASSIUM CHLORIDE, CALCIUM CHLORIDE 600; 310; 30; 20 MG/100ML; MG/100ML; MG/100ML; MG/100ML
INJECTION, SOLUTION INTRAVENOUS CONTINUOUS
Status: DISCONTINUED | OUTPATIENT
Start: 2021-07-21 | End: 2021-07-21 | Stop reason: HOSPADM

## 2021-07-21 RX ORDER — CALCIUM CHLORIDE 100 MG/ML
INJECTION INTRAVENOUS; INTRAVENTRICULAR
Status: DISCONTINUED
Start: 2021-07-21 | End: 2021-07-21 | Stop reason: WASHOUT

## 2021-07-21 RX ORDER — OXYCODONE HYDROCHLORIDE AND ACETAMINOPHEN 5; 325 MG/1; MG/1
1 TABLET ORAL ONCE
Status: COMPLETED | OUTPATIENT
Start: 2021-07-21 | End: 2021-07-21

## 2021-07-21 RX ORDER — OXYCODONE HYDROCHLORIDE AND ACETAMINOPHEN 5; 325 MG/1; MG/1
2 TABLET ORAL PRN
Status: DISCONTINUED | OUTPATIENT
Start: 2021-07-21 | End: 2021-07-21 | Stop reason: HOSPADM

## 2021-07-21 RX ORDER — FENTANYL CITRATE 50 UG/ML
INJECTION, SOLUTION INTRAMUSCULAR; INTRAVENOUS PRN
Status: DISCONTINUED | OUTPATIENT
Start: 2021-07-21 | End: 2021-07-21 | Stop reason: SDUPTHER

## 2021-07-21 RX ORDER — CALCIUM CHLORIDE 100 MG/ML
INJECTION INTRAVENOUS; INTRAVENTRICULAR PRN
Status: DISCONTINUED | OUTPATIENT
Start: 2021-07-21 | End: 2021-07-21 | Stop reason: ALTCHOICE

## 2021-07-21 RX ORDER — SODIUM CHLORIDE 9 MG/ML
INJECTION INTRAVENOUS
Status: DISCONTINUED
Start: 2021-07-21 | End: 2021-07-21 | Stop reason: HOSPADM

## 2021-07-21 RX ORDER — LIDOCAINE HYDROCHLORIDE 10 MG/ML
0.3 INJECTION, SOLUTION EPIDURAL; INFILTRATION; INTRACAUDAL; PERINEURAL
Status: DISCONTINUED | OUTPATIENT
Start: 2021-07-21 | End: 2021-07-21 | Stop reason: HOSPADM

## 2021-07-21 RX ORDER — DIPHENHYDRAMINE HYDROCHLORIDE 50 MG/ML
12.5 INJECTION INTRAMUSCULAR; INTRAVENOUS
Status: DISCONTINUED | OUTPATIENT
Start: 2021-07-21 | End: 2021-07-21 | Stop reason: HOSPADM

## 2021-07-21 RX ORDER — DEXAMETHASONE SODIUM PHOSPHATE 4 MG/ML
INJECTION, SOLUTION INTRA-ARTICULAR; INTRALESIONAL; INTRAMUSCULAR; INTRAVENOUS; SOFT TISSUE PRN
Status: DISCONTINUED | OUTPATIENT
Start: 2021-07-21 | End: 2021-07-21 | Stop reason: SDUPTHER

## 2021-07-21 RX ORDER — LABETALOL HYDROCHLORIDE 5 MG/ML
5 INJECTION, SOLUTION INTRAVENOUS EVERY 10 MIN PRN
Status: DISCONTINUED | OUTPATIENT
Start: 2021-07-21 | End: 2021-07-21 | Stop reason: HOSPADM

## 2021-07-21 RX ORDER — PROPOFOL 10 MG/ML
INJECTION, EMULSION INTRAVENOUS PRN
Status: DISCONTINUED | OUTPATIENT
Start: 2021-07-21 | End: 2021-07-21 | Stop reason: SDUPTHER

## 2021-07-21 RX ORDER — HEPARIN SODIUM 5000 [USP'U]/ML
INJECTION, SOLUTION INTRAVENOUS; SUBCUTANEOUS PRN
Status: DISCONTINUED | OUTPATIENT
Start: 2021-07-21 | End: 2021-07-21 | Stop reason: ALTCHOICE

## 2021-07-21 RX ORDER — PROMETHAZINE HYDROCHLORIDE 25 MG/ML
6.25 INJECTION, SOLUTION INTRAMUSCULAR; INTRAVENOUS
Status: DISCONTINUED | OUTPATIENT
Start: 2021-07-21 | End: 2021-07-21 | Stop reason: HOSPADM

## 2021-07-21 RX ORDER — LIDOCAINE HYDROCHLORIDE 20 MG/ML
INJECTION, SOLUTION INFILTRATION; PERINEURAL PRN
Status: DISCONTINUED | OUTPATIENT
Start: 2021-07-21 | End: 2021-07-21 | Stop reason: SDUPTHER

## 2021-07-21 RX ADMIN — FENTANYL CITRATE 50 MCG: 50 INJECTION INTRAMUSCULAR; INTRAVENOUS at 14:22

## 2021-07-21 RX ADMIN — OXYCODONE HYDROCHLORIDE AND ACETAMINOPHEN 1 TABLET: 5; 325 TABLET ORAL at 11:01

## 2021-07-21 RX ADMIN — CELECOXIB 200 MG: 200 CAPSULE ORAL at 11:02

## 2021-07-21 RX ADMIN — OXYCODONE HYDROCHLORIDE AND ACETAMINOPHEN 1 TABLET: 5; 325 TABLET ORAL at 15:21

## 2021-07-21 RX ADMIN — ONDANSETRON 4 MG: 2 INJECTION, SOLUTION INTRAMUSCULAR; INTRAVENOUS at 13:29

## 2021-07-21 RX ADMIN — Medication 0.5 MG: at 14:50

## 2021-07-21 RX ADMIN — Medication 0.5 MG: at 15:11

## 2021-07-21 RX ADMIN — FENTANYL CITRATE 50 MCG: 50 INJECTION INTRAMUSCULAR; INTRAVENOUS at 14:00

## 2021-07-21 RX ADMIN — SODIUM CHLORIDE, POTASSIUM CHLORIDE, SODIUM LACTATE AND CALCIUM CHLORIDE: 600; 310; 30; 20 INJECTION, SOLUTION INTRAVENOUS at 13:20

## 2021-07-21 RX ADMIN — PROPOFOL 200 MG: 10 INJECTION, EMULSION INTRAVENOUS at 13:29

## 2021-07-21 RX ADMIN — Medication 2000 MG: at 13:29

## 2021-07-21 RX ADMIN — LIDOCAINE HYDROCHLORIDE 100 MG: 20 INJECTION, SOLUTION INFILTRATION; PERINEURAL at 13:29

## 2021-07-21 RX ADMIN — DEXAMETHASONE SODIUM PHOSPHATE 8 MG: 4 INJECTION, SOLUTION INTRAMUSCULAR; INTRAVENOUS at 13:29

## 2021-07-21 RX ADMIN — Medication 0.5 MG: at 15:02

## 2021-07-21 ASSESSMENT — PAIN SCALES - GENERAL
PAINLEVEL_OUTOF10: 7
PAINLEVEL_OUTOF10: 8
PAINLEVEL_OUTOF10: 8
PAINLEVEL_OUTOF10: 0
PAINLEVEL_OUTOF10: 8
PAINLEVEL_OUTOF10: 8
PAINLEVEL_OUTOF10: 4
PAINLEVEL_OUTOF10: 4
PAINLEVEL_OUTOF10: 7

## 2021-07-21 ASSESSMENT — PAIN - FUNCTIONAL ASSESSMENT: PAIN_FUNCTIONAL_ASSESSMENT: 0-10

## 2021-07-21 ASSESSMENT — PAIN DESCRIPTION - ORIENTATION
ORIENTATION: LEFT

## 2021-07-21 ASSESSMENT — PAIN DESCRIPTION - PAIN TYPE
TYPE: SURGICAL PAIN

## 2021-07-21 ASSESSMENT — PAIN DESCRIPTION - LOCATION
LOCATION: KNEE

## 2021-07-21 NOTE — PROGRESS NOTES
Update History & Physical    The patient's History and Physical of  7/21/2021 was reviewed with the patient and I examined the patient. There was no change. The surgical site was confirmed by the patient and me. Plan: The risks, benefits, expected outcome, and alternative to the recommended procedure have been discussed with the patient. Patient understands and wants to proceed with the procedure.      Electronically signed by Quin Stanton MD on 7/21/2021 at 1:28 PM

## 2021-07-21 NOTE — ANESTHESIA PRE PROCEDURE
Department of Anesthesiology  Preprocedure Note       Name:  Holley Acosta   Age:  55 y.o.  :  1965                                          MRN:  [id number]         Date:  2021      Surgeon: Rony Reina):  Celeste Smith MD    Procedure: Procedure(s):  LEFT KNEE VIDEO ARTHROSCOPY WITH MEDIAL MENISCECTOMY AND SUBCHONDROPLASTY    Medications prior to admission:   Prior to Admission medications    Medication Sig Start Date End Date Taking? Authorizing Provider   estradiol (ESTRACE) 0.5 MG tablet 0.5 mg by Per G Tube route See Admin Instructions 20  Yes Historical Provider, MD   levothyroxine (SYNTHROID) 125 MCG tablet Take 125 mcg by mouth daily 19  Yes Historical Provider, MD   oxybutynin (DITROPAN-XL) 10 MG extended release tablet Take 10 mg by mouth daily 19  Yes Historical Provider, MD   atenolol (TENORMIN) 25 MG tablet Take 25 mg by mouth daily 19  Yes Historical Provider, MD   zolpidem (AMBIEN) 5 MG tablet Take 5 mg by mouth nightly.  19  Yes Historical Provider, MD   Cholecalciferol (VITAMIN D PO) Take 6,000 Units by mouth   Yes Historical Provider, MD   diclofenac (VOLTAREN) 50 MG EC tablet Take 1 tablet by mouth 2 times daily (with meals) 21   JONATHAN Odom   Homeopathic Products [facility]) GEL Apply 1-2 grams topically TID 10/9/19   Celeste Smith MD       Current medications:    Current Facility-Administered Medications   Medication Dose Route Frequency Provider Last Rate Last Admin    ceFAZolin (ANCEF) 2000 mg in sterile water 20 mL IV syringe  2,000 mg Intravenous Once Celeste Smith MD        lactated ringers infusion   Intravenous Continuous Stephen Boucher MD        sodium chloride flush 0.9 % injection 5-40 mL  5-40 mL Intravenous 2 times per day Stephen Boucher MD        sodium chloride flush 0.9 % injection 5-40 mL  5-40 mL Intravenous PRN Stephen Boucher MD        0.9 % sodium chloride infusion  25 mL Intravenous PRN Charolet Bud, MD Zannie Cowden lidocaine PF 1 % injection 0.3 mL  0.3 mL Intradermal Once PRN Thien Garcia MD           Allergies: Allergies   Allergen Reactions    Vancomycin Other (See Comments)     Red man syndrome      Amoxicillin     Codeine Other (See Comments)     hallucinations   hallucinates      Penicillins Hives, Nausea And Vomiting, Rash and Swelling       Problem List:    Patient Active Problem List   Diagnosis Code    Chronic pain of left knee M25.562, G89.29    Primary osteoarthritis of left knee M17.12       Past Medical History:        Diagnosis Date    Thyroid disease        Past Surgical History:        Procedure Laterality Date    BACK SURGERY      CHOLECYSTECTOMY      HYSTERECTOMY      KNEE SURGERY Left        Social History:    Social History     Tobacco Use    Smoking status: Former Smoker    Smokeless tobacco: Never Used   Substance Use Topics    Alcohol use: Never                                Counseling given: Not Answered      Vital Signs (Current):   Vitals:    07/20/21 1100 07/21/21 1041   BP:  109/70   Pulse:  53   Resp:  16   Temp:  96.9 °F (36.1 °C)   TempSrc:  Temporal   SpO2:  100%   Weight: 189 lb (85.7 kg) 185 lb (83.9 kg)   Height: 5' 7\" (1.702 m) 5' 7\" (1.702 m)                                              BP Readings from Last 3 Encounters:   07/21/21 109/70   06/23/21 106/67   05/27/21 118/79       NPO Status: Time of last liquid consumption: 0000                        Time of last solid consumption: 0000                        Date of last liquid consumption: 07/21/21                        Date of last solid food consumption: 07/21/21    BMI:   Wt Readings from Last 3 Encounters:   07/21/21 185 lb (83.9 kg)   06/23/21 185 lb (83.9 kg)   05/27/21 185 lb (83.9 kg)     Body mass index is 28.98 kg/m².     CBC: No results found for: WBC, RBC, HGB, HCT, MCV, RDW, PLT    CMP: No results found for: NA, K, CL, CO2, BUN, CREATININE, GFRAA, AGRATIO, LABGLOM, GLUCOSE, PROT, CALCIUM, BILITOT, ALKPHOS, AST, ALT    POC Tests: No results for input(s): POCGLU, POCNA, POCK, POCCL, POCBUN, POCHEMO, POCHCT in the last 72 hours. Coags: No results found for: PROTIME, INR, APTT    HCG (If Applicable): No results found for: PREGTESTUR, PREGSERUM, HCG, HCGQUANT     ABGs: No results found for: PHART, PO2ART, GFR7NWI, MCB7KVB, BEART, R1XTYELY     Type & Screen (If Applicable):  No results found for: LABABO, LABRH    Drug/Infectious Status (If Applicable):  No results found for: HIV, HEPCAB    COVID-19 Screening (If Applicable): No results found for: COVID19        Anesthesia Evaluation  Patient summary reviewed no history of anesthetic complications:   Airway: Mallampati: II  TM distance: >3 FB   Neck ROM: full  Mouth opening: > = 3 FB Dental: normal exam         Pulmonary:Negative Pulmonary ROS and normal exam  breath sounds clear to auscultation                             Cardiovascular:Negative CV ROS  Exercise tolerance: good (>4 METS),           Rhythm: regular  Rate: normal                    Neuro/Psych:   Negative Neuro/Psych ROS              GI/Hepatic/Renal: Neg GI/Hepatic/Renal ROS            Endo/Other: Negative Endo/Other ROS   (+) hypothyroidism::., .                 Abdominal:             Vascular: negative vascular ROS. Other Findings:          Pre-Operative Diagnosis: TIBIAL AND FEMORAL INSUFFICIENCY FRACTURE, MEDIAL MENISCUS TEAR LEFT KNEE    55 y.o.   BMI:  Body mass index is 28.98 kg/m².      Vitals:    07/20/21 1100 07/21/21 1041   BP:  109/70   Pulse:  53   Resp:  16   Temp:  96.9 °F (36.1 °C)   TempSrc:  Temporal   SpO2:  100%   Weight: 189 lb (85.7 kg) 185 lb (83.9 kg)   Height: 5' 7\" (1.702 m) 5' 7\" (1.702 m)       Allergies   Allergen Reactions    Vancomycin Other (See Comments)     Red man syndrome      Amoxicillin     Codeine Other (See Comments)     hallucinations   hallucinates      Penicillins Hives, Nausea And Vomiting, Rash and Swelling       Social History     Tobacco

## 2021-07-21 NOTE — OP NOTE
Operative Note      Patient: Kimberli Ross  YOB: 1965  MRN: [id number]    Date of Procedure: 2021    Pre-Op Diagnosis: TIBIAL AND FEMORAL INSUFFICIENCY FRACTURE, MEDIAL MENISCUS TEAR LEFT KNEE left cmc joint osteoarthritis    Post-Op Diagnosis: Same       Procedure(s):  LEFT KNEE VIDEO ARTHROSCOPY WITH MEDIAL MENISCECTOMY AND SUBCHONDROPLASTY   Chondroplasty   Left thumb cmc injection  Surgeon(s):  Pretty Bach MD    Assistant:   Surgical Assistant: Sy Sahni    Anesthesia: General    Estimated Blood Loss (mL): Minimal    Complications: None    Specimens:   * No specimens in log *    Implants:  Implant Name Type Inv. Item Serial No.  Lot No. LRB No. Used Action   GRAFT BONE SUB 5CC SHANNON BONE VOID SOLUM IV  GRAFT BONE SUB 5CC SHANNON BONE VOID SOLUM IV  [facility] [id number] Left 1 Implanted         Drains: * No LDAs found *    Findings: patellofemoral chondromalacia. Medial femoral chondromalacia    Detailed Description of Procedure:   OPERATIVE REPORT      Patient Name:   Kimberli Ross Surgeon:   Bennett Mckeon MD  :   1965 Dictated by:   Bennett Mckeon MD  MRN #:   [id number] PCP:  Rohith Mosley MD   Date of Surgery:     Referring:   No ref. provider found     PREOPERATIVE DIAGNOSES:   1. Torn medial meniscus,    knee. 2. Synovitis left kneeknee. 3. Chondromalacia left knee  4. Tibial stress fracture  left medial  5.medial   knee osteoarthritis left   6. left cmc osteoarthritis      POSTOPERATIVE DIAGNOSES:   1. Degenerative medial meniscus   2. Synovitis left  knee   3. Chondromalacia left  knee  MFC, PATELLA and TROCHLEA. 4.   Left cmc osteoarthritis  5. Tibial stress fracture medialleft             PROCEDURES PERFORMED:   1. Arthroscopy   left  knee with  chondroplasty   2. Major Synovectomy - 3 compartments  left   knee    3. Chondroplasty of the [address] and TROCHLEA. left knee.     4. Injection  left  knee with flouro localization  5. Injection left  cmc with flouro localization  6. Bone graft harvesting  left   hip   7. subchondrplasty  left   knee tibia and femur. SURGEON:   Ricke Peoples, M.D. Lucrecia Lesch: Lena Joseph, [facility]  ANESTHESIA:  General.       DETAILS OF PROCEDURE:  The patient was given preop medication and brought to the operating room where the surgery was again confirmed, as scheduled for the left knee. The patient was then placed on the table, given general anesthesia  (and intubated). A tourniquet was placed around the proximal  thigh and the leg was placed into a \"Surgical Assistant\" leg nagel. The  knee was then prepped with leftchloroprep and alcohol and then injected with 60 mL of 0.25% Marcaine with epinephrine. The knee was then prepped with DuraPrep and draped in the usual manner. The tourniquet was inflated.)   After exsanguination with an Esmarch bandage, the tourniquet was inflated to a level of 300 mmHg.)  A time-out confirmation was performed, according to the universal protocol. Appropriate antibiotics were given prior to the start of the surgery.)   Arthroscopy was then carried out through the inferolateral portal; saline inflow was superomedial, and an inferomedial portal was also utilized. The suprapatellar pouch was examined first.  noneloose bodies were seen. The patella was found to have Grade II chondromalacia of theLateral  facet(s). The superior compartment had  significant synovitis and fat pad hypertrophy. The medial compartment was then examined. There were none loose bodies. The anterior horn and body of the meniscus wereintact  The posterior horn was found to bedegenerative The [address] [place] had  Grade IV chondromalacia locally with extensive cartilage chondromalaciaof the weight-bearing portion. The MTP had  Grade I chondromalacia. The ACL was examined and noted to be intact. The lateral compartment was examined. There were none loose bodies.   The anterior horn and body of the meniscus were intact  intact . The posterior horn was found to intact  The LFC had Grade I chondromalacia of the weight-bearing portion. The LTP had Grade I chondromalacia. A Medial   stump of the meniscus was sealed using the ArthroWand coblation tool. The lower portion of the trochlea was noted to have Grade III extensively chondromalacia. A chondroplasty of the MFC, PATELLA and TROCHLEA. was performed using the ArthroWand coblation tool, through the medial /lateral  portals. Marrow bone graft was obtained through incision in the  left anterior iliac crest.   This was fractionated into platelet rich/poor plasma and stem cell aggregate. Injection was done through needle localization into the medial aspect of the anterior third of the tibia and the middle of the femoral condyle medially. Based on the preoperative review of the patient's left knee MRI the location of the bone marrow lesion, consistent with an insufficiency or stress fracture, and the tibial plateau ) and femoral condyle was identified. Preoperative surgical planning allowed further determination of the optimal method for accessing the lesion. Intraoperatively, image fluoroscopy combined with direct intra-articular correlation with arthroscopic instruments and correlation with reviewing of MRI images and fluoroscopy Were used to guide surgical instruments to the proximity of the subchondral tibial plateau and femoral condyle fracture specifically, the Jamshidi needle Was placed in the subchondral bone of the tibia  and Femur. Standard repair methodology was used to treat the subchondral bone defect in the tibial plateau and femoral condyle.  Image fluoroscopy was utilized to confirm accurate insertion of the Jamshidi needle Into the subchondral bone of the tibia and femur., Fracture stabilization was performed by injecting  Bone Marrow aspirate/graft concentrate Into the tibial plateauand femoral condyle. Image fluoroscopy was used to monitor the injection process and ensure injection of the bone substitute into the subchondral bone so that following polymerization, the bone substitute stabilize a fracture and facilitated fracture repair. The injected wounds were closed and sterile dressing was applied. Injection of raw marrow and stem cell aggregate was done into the respective areas of bone. Injection of PRP, PPP was done into the joint through a lateral approach. Injection Procedure Note  Procedure Details     Verbal consent was obtained for the procedure. Theleft  knee(s) was prepped with iodine and the skin was anesthetized. Using a 22 gauge needle the left knee(s) joint  under flouro is injected with injection of 6cc of prp low leukocyte count under the lateral aspect of the knee. The injection site was cleansed with topical isopropyl alcohol and a dressing was applied. Complications:  None; patient tolerated the procedure well. Using a 22 gauge needle the left   cmc is injected with injection of 3cc of prp low leukocyte count identified with use of flouro. The injection site was cleansed with topical isopropyl alcohol and a dressing was applied. A synovectomy was performed of the medial, lateral and superior compartments, using the Aggressor shaver. Hemostasis was achieved using the coblation tool. The synovectomy was performed through both the medial and lateral portals. The base of the synovial tissue was sealed using the ArthroWand tool. Final video-photographs were taken. The joint was then thoroughly irrigated and suctioned. The instruments were removed. The wounds were approximated with interrupted 2-0 nylon. (15) ml of the 0.25% Marcaine with Epinephrine was injected. A simple dressing, followed by a thicker overlying compression bandage were applied.       The patient was then awakened from anesthesia, transferred to the stretcher, and brought to the recovery room in satisfactory condition.   Sponge and needle counts were correct.             ____________________________________           Aliya Posadas MD        Electronically signed by Aliya Posadas MD on 7/21/2021 at 2:34 PM

## 2021-07-21 NOTE — PROGRESS NOTES
Discharge  instructions reviewed. Pt and family verbalize understanding with no further questions. VSS. Pt discharged via [facility] to car. Assessment unchanged.

## 2021-07-21 NOTE — ANESTHESIA POSTPROCEDURE EVALUATION
Department of Anesthesiology  Postprocedure Note    Patient: Patrick Temple  MRN: [id number]  YOB: 1965  Date of evaluation: 7/21/2021  Time:  3:49 PM     Procedure Summary     Date: 07/21/21 Room / Location: [facility] EG OR 02 / [facility]    Anesthesia Start: 1325 Anesthesia Stop: 6178    Procedure: LEFT KNEE VIDEO ARTHROSCOPY WITH MEDIAL MENISCECTOMY AND SUBCHONDROPLASTY (Left Knee) Diagnosis: (TIBIAL AND FEMORAL INSUFFICIENCY FRACTURE, MEDIAL MENISCUS TEAR LEFT KNEE)    Surgeons: Carter Jamil MD Responsible Provider: Christen Robles MD    Anesthesia Type: general ASA Status: 1          Anesthesia Type: general    Connor Phase I: Connor Score: 9    Connor Phase II: Connor Score: 10    Last vitals: Reviewed and per EMR flowsheets. Anesthesia Post Evaluation    Comments: Postoperative Anesthesia Note    Name:    Patrick Temple  MRN:      [id number]    Patient Vitals in the past 12 hrs:  07/21/21 1524, BP:(!) 140/75, Temp:97.9 °F (36.6 °C), Temp src:Temporal, Pulse:80, Resp:20, SpO2:99 %  07/21/21 1509, BP:(!) 148/67, Pulse:72, Resp:20, SpO2:99 %  07/21/21 1456, BP:(!) 148/90, Pulse:73, Resp:20, SpO2:97 %  07/21/21 1451, BP:(!) 155/94, Pulse:80, Resp:20, SpO2:95 %  07/21/21 1446, BP:(!) 146/76, Temp:97.9 °F (36.6 °C), Temp src:Temporal, Pulse:82, Resp:20, SpO2:96 %  07/21/21 1041, BP:109/70, Temp:96.9 °F (36.1 °C), Temp src:Temporal, Pulse:53, Resp:16, SpO2:100 %, Height:5' 7\" (1.702 m), Weight:185 lb (83.9 kg)     LABS:    CBC  No results found for: WBC, HGB, HCT, PLT  RENAL  No results found for: NA, K, CL, CO2, BUN, CREATININE, GLUCOSE  COAGS  No results found for: PROTIME, INR, APTT    Intake & Output:  @90DMOF@    Nausea & Vomiting:  No    Level of Consciousness:  Awake    Pain Assessment:  Adequate analgesia    Anesthesia Complications:  No apparent anesthetic complications    SUMMARY      Vital signs stable  OK to discharge from Stage I post anesthesia care.   Care transferred from Anesthesiology department on discharge from perioperative area

## 2021-08-03 ENCOUNTER — OFFICE VISIT (OUTPATIENT)
Dept: ORTHOPEDIC SURGERY | Age: 56
End: 2021-08-03

## 2021-08-03 VITALS
DIASTOLIC BLOOD PRESSURE: 70 MMHG | WEIGHT: 185 LBS | BODY MASS INDEX: 29.03 KG/M2 | HEIGHT: 67 IN | SYSTOLIC BLOOD PRESSURE: 102 MMHG | HEART RATE: 58 BPM

## 2021-08-03 DIAGNOSIS — S82.142G CLOSED FRACTURE OF LEFT TIBIAL PLATEAU WITH DELAYED HEALING, SUBSEQUENT ENCOUNTER: Primary | ICD-10-CM

## 2021-08-03 PROCEDURE — 99024 POSTOP FOLLOW-UP VISIT: CPT | Performed by: ORTHOPAEDIC SURGERY

## 2021-08-03 RX ORDER — OXYCODONE HYDROCHLORIDE AND ACETAMINOPHEN 5; 325 MG/1; MG/1
1 TABLET ORAL EVERY 6 HOURS PRN
Qty: 28 TABLET | Refills: 0 | Status: SHIPPED | OUTPATIENT
Start: 2021-08-03 | End: 2021-08-10

## 2021-08-03 NOTE — PROGRESS NOTES
Patient Name: Flaquita Yadav MRN: [id number]   Age: 55 y.o. YOB: 1965   Sex: female         [address] Complaint   Patient presents with    Post-Op Check     LT KNEE SCOPE-7/21/21       HISTORY OF PRESENT ILLNESS   Patient returns for first postoperative visit status post arthroscopy of their knee. They have moderate complaints of pain. There is mild swelling about the knee. They deny any numbness or tingling. They are ambulating with the use of her crutches. Assessment   PHYSICAL EXAM   Vital Signs: /70   Pulse 58   Ht 5' 7\" (1.702 m)   Wt 185 lb (83.9 kg)   BMI 28.98 kg/m²   Examination of the knee shows a well-healed incisions      There is mild swelling. There is no drainage. Range of motion is 0-90°. There is no calf tenderness. The patient is neurovascularly intact. NEUROLOGICALLY: There is no evidence for sensory or motor deficits in the extremity. Coordination appears full with no spacticity or rigidity. Reflexes appear to be symmetric. Skin shows no rashes/ecchymosis to the affected area, no hyperesthesias, no discoloration, no temperature or color discrepancies. Distal circulation intact. No signs of RSD. Steri-Strips were applied. With suture removal there was some bleeding along the lateral incision which was addressed as not showing evidence of infection but the benzoin and Steri-Strips were applied    IMPRESSION   2-3 week status post  leftknee arthroscopy      PLAN   The patient is doing well 2-3 week status post leftknee arthroscopy. They will begin a general  therapy for aggressive range of motion and strengthening. The patient will slowly resume normal activities. Limited weight bearing because of pain in the medial femur and tibia. Follow-up of the wound to assure that there is no increased drainage. Brace usage was recommended     The patient will follow up in 4-6 weeks for repeat evaluation.

## 2021-08-03 NOTE — LETTER
[address] [place] [address]  Arianna Damon [address] [place] [address]  Phone: [phone]  Fax: [phone]    Quin Stanton MD        August 3, 2021      Raudel Santana was seen in the office today 8/3/21 after knee surgery 7/21/21. She will be off work until September 15th.          Sincerely,        Quin Stanton MD

## 2021-10-05 ENCOUNTER — OFFICE VISIT (OUTPATIENT)
Dept: ORTHOPEDIC SURGERY | Age: 56
End: 2021-10-05
Payer: COMMERCIAL

## 2021-10-05 VITALS
WEIGHT: 185 LBS | BODY MASS INDEX: 29.03 KG/M2 | HEIGHT: 67 IN | SYSTOLIC BLOOD PRESSURE: 112 MMHG | DIASTOLIC BLOOD PRESSURE: 70 MMHG | HEART RATE: 59 BPM

## 2021-10-05 DIAGNOSIS — M25.562 CHRONIC PAIN OF LEFT KNEE: ICD-10-CM

## 2021-10-05 DIAGNOSIS — M17.12 PRIMARY OSTEOARTHRITIS OF LEFT KNEE: ICD-10-CM

## 2021-10-05 DIAGNOSIS — G89.29 CHRONIC PAIN OF LEFT KNEE: ICD-10-CM

## 2021-10-05 DIAGNOSIS — S82.142G CLOSED FRACTURE OF LEFT TIBIAL PLATEAU WITH DELAYED HEALING, SUBSEQUENT ENCOUNTER: Primary | ICD-10-CM

## 2021-10-05 PROCEDURE — L1812 KO ELASTIC W/JOINTS PRE OTS: HCPCS | Performed by: PHYSICIAN ASSISTANT

## 2021-10-05 PROCEDURE — 20610 DRAIN/INJ JOINT/BURSA W/O US: CPT | Performed by: PHYSICIAN ASSISTANT

## 2021-10-05 PROCEDURE — 99024 POSTOP FOLLOW-UP VISIT: CPT | Performed by: PHYSICIAN ASSISTANT

## 2021-10-05 RX ORDER — TRIAMCINOLONE ACETONIDE 40 MG/ML
40 INJECTION, SUSPENSION INTRA-ARTICULAR; INTRAMUSCULAR ONCE
Status: COMPLETED | OUTPATIENT
Start: 2021-10-05 | End: 2021-10-05

## 2021-10-05 RX ORDER — ROPIVACAINE HYDROCHLORIDE 5 MG/ML
30 INJECTION, SOLUTION EPIDURAL; INFILTRATION; PERINEURAL ONCE
Status: COMPLETED | OUTPATIENT
Start: 2021-10-05 | End: 2021-10-05

## 2021-10-05 RX ORDER — LIDOCAINE HYDROCHLORIDE 10 MG/ML
20 INJECTION, SOLUTION INFILTRATION; PERINEURAL ONCE
Status: COMPLETED | OUTPATIENT
Start: 2021-10-05 | End: 2021-10-05

## 2021-10-05 RX ADMIN — LIDOCAINE HYDROCHLORIDE 20 ML: 10 INJECTION, SOLUTION INFILTRATION; PERINEURAL at 11:15

## 2021-10-05 RX ADMIN — ROPIVACAINE HYDROCHLORIDE 30 ML: 5 INJECTION, SOLUTION EPIDURAL; INFILTRATION; PERINEURAL at 11:16

## 2021-10-05 RX ADMIN — TRIAMCINOLONE ACETONIDE 40 MG: 40 INJECTION, SUSPENSION INTRA-ARTICULAR; INTRAMUSCULAR at 11:15

## 2021-10-06 NOTE — PROGRESS NOTES
Patient Name: Gillian Hubbard MRN: [id number]   Age: 55 y.o. YOB: 1965   Sex: female         [address] Complaint   Patient presents with    Post-Op Check     LT KNEE SCOPE 7/21/21       HISTORY OF PRESENT ILLNESS   Patient returns for second postoperative visit status post arthroscopy of their knee. They have moderate complaints of pain. Rates pain as a 6 out of 10 notes she was doing well for a while but over the past couple of weeks she has noticed increase in pain diffusely around the knee notes that the brace that she has been utilizing causes swelling across the lower foot and ankle and standing for long periods of time also does the same. Denies any trauma fall or injury denies buckling or give way of the knee      There is mild swelling about the knee. They deny any numbness or tingling. They are ambulating with the use of her cane intermittently however mostly not using any assistive walking devices. Assessment   PHYSICAL EXAM   Vital Signs: /70   Pulse 59   Ht 5' 7\" (1.702 m)   Wt 185 lb (83.9 kg)   BMI 28.98 kg/m²   Examination of the knee shows a well-healed incisions      There is mild swelling. There is no drainage. Range of motion is 0-120°. There is no calf tenderness. The patient is neurovascularly intact. NEUROLOGICALLY: There is no evidence for sensory or motor deficits in the extremity. Coordination appears full with no spacticity or rigidity. Reflexes appear to be symmetric. Skin shows no rashes/ecchymosis to the affected area, no hyperesthesias, no discoloration, no temperature or color discrepancies. Distal circulation intact. No signs of RSD. IMPRESSION   10 week status post  left knee arthroscopy    X-ray  4 view x-ray of the left knee AP, lateral, sunrise and tunnel views were performed and reviewed today and my impression is:  Moderate medial and lateral compartment joint space decrease slightly increased on the medial side in comparison to the lateral side. No significant osteophyte formation noted tricompartmentally minimal changes noted the patellofemoral compartment. Mild varus angulation noted. No evidence of acute fracture dislocation. PLAN   The patient is doing well 10 week status post leftknee arthroscopy. They will continue with general  therapy for aggressive range of motion and strengthening. The patient will slowly resume normal activities. Placed patient into a economy hinged knee brace for stabilization of the knee utilization when she is at work. Gave patient corticosteroid injection to the left knee at today's visit no complaints or concerns arisen. Advised patient to rest ice and elevate the knee for the next 24 to 48 hours avoid heavy or strenuous activities as much as possible and can return to normal activities as tolerated after that. Injection Procedure Note  Procedure Details     Verbal consent was obtained for the procedure. The left knee(s) was prepped with iodine and the skin was anesthetized. Using a 22 gauge needle the left knee(s) joint is injected with 2 ml 1% lidocaine and 2 ml of triamcinolone (KENALOG) 40mg/ml under the lateral aspect of the knee. The injection site was cleansed with topical isopropyl alcohol and a dressing was applied. Complications:  None; patient tolerated the procedure well. Discussed with patient at she can return to utilizing the diclofenac as needed if she chooses to. The patient will follow up in 4-6 weeks for repeat evaluation.

## 2021-11-02 ENCOUNTER — HOSPITAL ENCOUNTER (EMERGENCY)
Age: 56
Discharge: HOME | End: 2021-11-03
Payer: COMMERCIAL

## 2021-11-02 DIAGNOSIS — R25.2 BILATERAL LEG CRAMPS: Primary | ICD-10-CM

## 2021-11-02 PROCEDURE — 99283 EMERGENCY DEPT VISIT LOW MDM: CPT

## 2021-11-02 ASSESSMENT — PAIN DESCRIPTION - FREQUENCY: FREQUENCY: CONTINUOUS

## 2021-11-02 ASSESSMENT — PAIN DESCRIPTION - ONSET: ONSET: ON-GOING

## 2021-11-02 ASSESSMENT — PAIN DESCRIPTION - PAIN TYPE: TYPE: ACUTE PAIN

## 2021-11-02 ASSESSMENT — PAIN DESCRIPTION - DESCRIPTORS: DESCRIPTORS: ACHING

## 2021-11-02 ASSESSMENT — PAIN SCALES - GENERAL: PAINLEVEL_OUTOF10: 3

## 2021-11-02 ASSESSMENT — PAIN DESCRIPTION - ORIENTATION: ORIENTATION: RIGHT;LEFT

## 2021-11-02 ASSESSMENT — PAIN DESCRIPTION - LOCATION: LOCATION: LEG;ANKLE

## 2021-11-03 VITALS
BODY MASS INDEX: 29 KG/M2 | DIASTOLIC BLOOD PRESSURE: 69 MMHG | HEART RATE: 52 BPM | RESPIRATION RATE: 16 BRPM | WEIGHT: 185.19 LBS | SYSTOLIC BLOOD PRESSURE: 102 MMHG | TEMPERATURE: 97.2 F | OXYGEN SATURATION: 99 %

## 2021-11-03 LAB
ANION GAP SERPL CALCULATED.3IONS-SCNC: 8 MMOL/L (ref 3–16)
BASOPHILS ABSOLUTE: 0.1 K/UL (ref 0–0.2)
BASOPHILS RELATIVE PERCENT: 0.8 %
BUN BLDV-MCNC: 17 MG/DL (ref 7–20)
CALCIUM SERPL-MCNC: 9.5 MG/DL (ref 8.3–10.6)
CHLORIDE BLD-SCNC: 96 MMOL/L (ref 99–110)
CO2: 28 MMOL/L (ref 21–32)
CREAT SERPL-MCNC: 0.9 MG/DL (ref 0.6–1.1)
EOSINOPHILS ABSOLUTE: 0.1 K/UL (ref 0–0.6)
EOSINOPHILS RELATIVE PERCENT: 1.1 %
GFR AFRICAN AMERICAN: >60
GFR NON-AFRICAN AMERICAN: >60
GLUCOSE BLD-MCNC: 80 MG/DL (ref 70–99)
HCT VFR BLD CALC: 37.5 % (ref 36–48)
HEMOGLOBIN: 12.3 G/DL (ref 12–16)
LYMPHOCYTES ABSOLUTE: 1.3 K/UL (ref 1–5.1)
LYMPHOCYTES RELATIVE PERCENT: 18.5 %
MCH RBC QN AUTO: 27.3 PG (ref 26–34)
MCHC RBC AUTO-ENTMCNC: 32.8 G/DL (ref 31–36)
MCV RBC AUTO: 83.2 FL (ref 80–100)
MONOCYTES ABSOLUTE: 0.6 K/UL (ref 0–1.3)
MONOCYTES RELATIVE PERCENT: 8.2 %
NEUTROPHILS ABSOLUTE: 5 K/UL (ref 1.7–7.7)
NEUTROPHILS RELATIVE PERCENT: 71.4 %
PDW BLD-RTO: 14.6 % (ref 12.4–15.4)
PLATELET # BLD: 244 K/UL (ref 135–450)
PMV BLD AUTO: 9.1 FL (ref 5–10.5)
POTASSIUM SERPL-SCNC: 4.4 MMOL/L (ref 3.5–5.1)
RBC # BLD: 4.51 M/UL (ref 4–5.2)
SODIUM BLD-SCNC: 132 MMOL/L (ref 136–145)
WBC # BLD: 7.1 K/UL (ref 4–11)

## 2021-11-03 PROCEDURE — 80048 BASIC METABOLIC PNL TOTAL CA: CPT

## 2021-11-03 PROCEDURE — 36415 COLL VENOUS BLD VENIPUNCTURE: CPT

## 2021-11-03 PROCEDURE — 85025 COMPLETE CBC W/AUTO DIFF WBC: CPT

## 2021-11-03 ASSESSMENT — ENCOUNTER SYMPTOMS
APNEA: 0
CHOKING: 0
EYE DISCHARGE: 0
VOMITING: 0
FACIAL SWELLING: 0
SORE THROAT: 0
SHORTNESS OF BREATH: 0
NAUSEA: 0
EYE REDNESS: 0
ABDOMINAL PAIN: 0
BACK PAIN: 0

## 2021-11-03 NOTE — ED PROVIDER NOTES
**ADVANCED PRACTICE PROVIDER, I HAVE EVALUATED THIS PATIENT**        [address]      Pt Name: Forrest Arredondo  CBP:[id number]  [place] 1965  Date of evaluation: 11/2/2021  Provider: Deepak Richard PA-C      Chief Complaint:    Chief Complaint   Patient presents with    Foot Pain     Pt reports \"bilateral knee and ankle pain for the last 4 days\". Pt rates pain as a 3/10 at this time with no signs of distress noted a this time.  Ankle Pain         Nursing Notes, Past Medical Hx, Past Surgical Hx, Social Hx, Allergies, and Family Hx were all reviewed and agreed with or any disagreements were addressed in the HPI.    HPI: (Location, Duration, Timing, Severity, Quality, Assoc Sx, Context, Modifying factors)    This is a  55 y.o. female who presents to the emergency room with chief complaint of bilateral leg cramps over the last 4 days. She is also recently been treated for UTI. Finished her last dose of Bactrim. Denies any pain with urination at this time. No abdominal pain, no fevers, no extremity weakness. Just in the last 4 nights he began the severe leg cramps. Says he is drinking plenty of water. She has been taking Gatorade. She only takes vitamin D. She does not take a multivitamin. No other complaints.       PastMedical/Surgical History:      Diagnosis Date    Thyroid disease          Procedure Laterality Date    BACK SURGERY      CHOLECYSTECTOMY      HYSTERECTOMY      KNEE ARTHROSCOPY Left 7/21/2021    LEFT KNEE VIDEO ARTHROSCOPY WITH MEDIAL MENISCECTOMY AND SUBCHONDROPLASTY performed by Joey Hernandez MD at [address] Left        Medications:  Previous Medications    ATENOLOL (TENORMIN) 25 MG TABLET    Take 25 mg by mouth daily    CHOLECALCIFEROL (VITAMIN D PO)    Take 6,000 Units by mouth    ESTRADIOL (ESTRACE) 0.5 MG TABLET    0.5 mg by Per G Tube route See Admin Instructions HOMEOPATHIC PRODUCTS (SPEEDGEL) GEL    Apply 1-2 grams topically TID    LEVOTHYROXINE (SYNTHROID) 125 MCG TABLET    Take 125 mcg by mouth daily    OXYBUTYNIN (DITROPAN-XL) 10 MG EXTENDED RELEASE TABLET    Take 10 mg by mouth daily    ZOLPIDEM (AMBIEN) 5 MG TABLET    Take 5 mg by mouth nightly. Review of Systems:  (2-9 systems needed)  Review of Systems   Constitutional: Negative for chills and fever. HENT: Negative for congestion, facial swelling and sore throat. Eyes: Negative for discharge and redness. Respiratory: Negative for apnea, choking and shortness of breath. Cardiovascular: Negative for chest pain. Gastrointestinal: Negative for abdominal pain, nausea and vomiting. Genitourinary: Negative for dysuria. Musculoskeletal: Negative for back pain, neck pain and neck stiffness. Neurological: Negative for dizziness, tremors, seizures, weakness and headaches. All other systems reviewed and are negative. \"Positives and Pertinent negatives as per HPI\"    Physical Exam:  Physical Exam  Vitals and nursing note reviewed. Cardiovascular:      Rate and Rhythm: Normal rate and regular rhythm. Heart sounds: Normal heart sounds. No murmur heard. No friction rub. No gallop. Pulmonary:      Effort: Pulmonary effort is normal. No respiratory distress. Breath sounds: Normal breath sounds. No wheezing or rales. Chest:      Chest wall: No tenderness. Musculoskeletal:      Right lower leg: Normal. No swelling, deformity, tenderness or bony tenderness. No edema. Left lower leg: Normal. No swelling, deformity, tenderness or bony tenderness. No edema. Right ankle: Normal. No swelling or deformity. No tenderness. Normal range of motion. Normal pulse. Left ankle: Normal. No swelling or deformity. No tenderness. Normal range of motion. Normal pulse.          MEDICAL DECISION MAKING    Vitals:    Vitals:    11/02/21 2300   BP: 102/69   Pulse: 52   Resp: 20   Temp: 97.2 °F (36.2 °C)   TempSrc: Temporal   SpO2: 99%   Weight: 185 lb 3 oz (84 kg)       LABS:  Labs Reviewed   BASIC METABOLIC PANEL - Abnormal; Notable for the following components:       Result Value    Sodium 132 (*)     Chloride 96 (*)     All other components within normal limits    Narrative:     Performed at:  [facility]  [address] [place], [place] [address]   Phone [phone]   CBC WITH AUTO DIFFERENTIAL    Narrative:     Performed at:  [facility]  [address] [place], [place] [address]   Phone [phone]   CBC WITH AUTO DIFFERENTIAL   BASIC METABOLIC PANEL        Remainder of labs reviewed and were negative at this time or not returned at the time of this note. RADIOLOGY:   Non-plain film images such as CT, Ultrasound and MRI are read by the radiologist. Glory Gamble PA-C have directly visualized the radiologic plain film image(s) with the below findings:      Interpretation per the Radiologist below, if available at the time of this note:    No orders to display        XR KNEE LEFT (MIN 4 VIEWS)    Result Date: 10/5/2021  Radiology exam is complete. No Radiologist dictation. Please follow up with ordering provider. MEDICAL DECISION MAKING / ED COURSE:      PROCEDURES:   Procedures    None    Patient was given:  Medications - No data to display    Emergency room course: Patient on exam cardiovascular regular rhythm, lungs are clear. No wheeze rales or rhonchi noted. Patient bilateral lower extremities bilateral knee show no tenderness bilateral calf show no tenderness no swelling no deformity. No redness. No warmth. Bilateral ankle show no swelling no tenderness. Pedal pulses bilaterally 2+ equal bilaterally. No other motor or sensory deficit noted. I did contact our pharmacist.  And I was informed that the Bactrim can cause spasm and cramps. As well as arthralgia myalgias.     I did inform the patient she took her last dose this may be overweight. We will check some basic labs to make sure electrolytes and everything are normal.    Lab result today shows:  CBC within normal limits with a white count of 7.1. BMP shows sodium 132, potassium 4.4, chloride 96 with BUN 17 and creatinine 0.9. Discussed patient lab result from today with her as well did inform her that pharmacy did states that this Bactrim can cause some spasms and joint pain. So since she is just completed the course of the Bactrim this should resolve. Take OTC Tylenol or Motrin as needed for pain. Follow-up with her primary care physician and return for any worsening. She will be discharged stable condition. The patient tolerated their visit well. I evaluated the patient. The physician was available for consultation as needed. The patient and / or the family were informed of the results of any tests, a time was given to answer questions, a plan was proposed and they agreed with plan. CLINICAL IMPRESSION:  1.  Bilateral leg cramps        DISPOSITION  DISPOSITION Decision To Discharge 11/03/2021 12:41:56 AM        PATIENT REFERRED TO:  Meli Chen MD  [facility] [address]  [phone]    Call   As needed      DISCHARGE MEDICATIONS:  New Prescriptions    No medications on file       DISCONTINUED MEDICATIONS:  Discontinued Medications    No medications on file              (Please note the MDM and HPI sections of this note were completed with a voice recognition program.  Efforts were made to edit the dictations but occasionally words are mis-transcribed.)    Electronically signed, Johnnie Stephens PA-C,          Johnnie Stephens PA-C  11/03/21 6331

## 2022-07-05 ENCOUNTER — TELEPHONE (OUTPATIENT)
Dept: ORTHOPEDIC SURGERY | Age: 57
End: 2022-07-05

## 2022-07-05 NOTE — TELEPHONE ENCOUNTER
Prescription Refill     Medication Name:  DICLOFENAC   Pharmacy: [address] [place] [id number]  Patient Contact Number:  [phone]    PATIENT IS REQ REFILL OF DICLOFENAC 50 MG. PLEASE RETURN CALL TO PATIENT AT ABOVE NUMBER.

## 2022-08-03 ENCOUNTER — TELEPHONE (OUTPATIENT)
Dept: ORTHOPEDIC SURGERY | Age: 57
End: 2022-08-03

## 2022-08-03 NOTE — TELEPHONE ENCOUNTER
Faxed medical records (renato - Delfino Bingham) to Dr. Nieves Chaves with [facility] @ [phone]    Pushed images thru PACS

## (undated) DEVICE — SYRINGE 10ML HYPO W/O NDL W/ LUER SLP TP

## (undated) DEVICE — TUBING PMP IRRIG GOFLO

## (undated) DEVICE — Device

## (undated) DEVICE — 60 ML SYRINGE REGULAR TIP: Brand: MONOJECT

## (undated) DEVICE — DYONICS 3.5 MM ABRADER BURRS, 7 CM                                    LENGTH, AQUA, PACKAGED 6 PER BOX, STERILE

## (undated) DEVICE — MANIFOLD SURG NEPTUNE WST MGMT

## (undated) DEVICE — GLOVE NON LATEX  SIZE 8.0

## (undated) DEVICE — PACK PROCEDURE SURG SURGERYARTHROSCOPY KNEE

## (undated) DEVICE — STERILE LATEX POWDER-FREE SURGICAL GLOVES: Brand: PROTEXIS

## (undated) DEVICE — ART BMC PLUS PROCESSING KIT: Brand: CELLING ART BMC SYSTEM

## (undated) DEVICE — PADDING CAST N ADH 12X6 IN CRIMPED FINISH 100% COTTON WBRLII

## (undated) DEVICE — STERILE LATEX POWDER-FREE SURGICAL GLOVESWITH NITRILE COATING: Brand: PROTEXIS

## (undated) DEVICE — SUTURE ETHLN SZ 2-0 L18IN NONABSORBABLE BLK L19MM PS-2 PRIM 593H

## (undated) DEVICE — SMART SLEEVE SURGICAL GOWN, XL, POLYREINFORCED FRONT: Brand: CONVERTORS

## (undated) DEVICE — SMALL VOLUME BONE MARROW ASPIRATION KIT: Brand: SPINESMITH FUSIONARY GRAFT DELIVERY SYSTEM

## (undated) DEVICE — Z CONVERTED USE 2273232 BANDAGE COMPR W6INXL11YD E KNIT DBL SELF CLSR EZE-BAND

## (undated) DEVICE — SOLUTION IV IRRIG 0.9% NACL 3000ML BAG 2B7477

## (undated) DEVICE — SUTURE MCRYL SZ 4-0 L18IN ABSRB UD L19MM PS-2 3/8 CIR PRIM Y496G

## (undated) DEVICE — DRAPE 33X23IN INCISE ANTIMICROB IOBAN 2